# Patient Record
Sex: MALE | NOT HISPANIC OR LATINO | Employment: FULL TIME | URBAN - METROPOLITAN AREA
[De-identification: names, ages, dates, MRNs, and addresses within clinical notes are randomized per-mention and may not be internally consistent; named-entity substitution may affect disease eponyms.]

---

## 2020-08-08 ENCOUNTER — HOSPITAL ENCOUNTER (EMERGENCY)
Facility: CLINIC | Age: 25
Discharge: HOME | End: 2020-08-08
Attending: PHYSICIAN ASSISTANT | Admitting: PHYSICIAN ASSISTANT
Payer: OTHER MISCELLANEOUS

## 2020-08-08 VITALS
HEART RATE: 68 BPM | OXYGEN SATURATION: 99 % | DIASTOLIC BLOOD PRESSURE: 81 MMHG | TEMPERATURE: 98.6 F | RESPIRATION RATE: 16 BRPM | SYSTOLIC BLOOD PRESSURE: 133 MMHG

## 2020-08-08 DIAGNOSIS — T14.8XXA AVULSION OF SKIN: ICD-10-CM

## 2020-08-08 PROCEDURE — 90715 TDAP VACCINE 7 YRS/> IM: CPT | Performed by: PHYSICIAN ASSISTANT

## 2020-08-08 PROCEDURE — 25000128 H RX IP 250 OP 636: Performed by: PHYSICIAN ASSISTANT

## 2020-08-08 PROCEDURE — 90471 IMMUNIZATION ADMIN: CPT

## 2020-08-08 PROCEDURE — 99283 EMERGENCY DEPT VISIT LOW MDM: CPT | Mod: 25

## 2020-08-08 RX ADMIN — CLOSTRIDIUM TETANI TOXOID ANTIGEN (FORMALDEHYDE INACTIVATED), CORYNEBACTERIUM DIPHTHERIAE TOXOID ANTIGEN (FORMALDEHYDE INACTIVATED), BORDETELLA PERTUSSIS TOXOID ANTIGEN (GLUTARALDEHYDE INACTIVATED), BORDETELLA PERTUSSIS FILAMENTOUS HEMAGGLUTININ ANTIGEN (FORMALDEHYDE INACTIVATED), BORDETELLA PERTUSSIS PERTACTIN ANTIGEN, AND BORDETELLA PERTUSSIS FIMBRIAE 2/3 ANTIGEN 0.5 ML: 5; 2; 2.5; 5; 3; 5 INJECTION, SUSPENSION INTRAMUSCULAR at 18:12

## 2020-08-08 ASSESSMENT — ENCOUNTER SYMPTOMS
WOUND: 1
NUMBNESS: 0

## 2020-08-08 NOTE — ED PROVIDER NOTES
History     Chief Complaint:  Leg Injury    HPI   Portia Whitehead is a 25 year old male who presents to the ED for leg pain.  The patient explains that at 1130 he was at work and had flat bed cart scrape his lower shin from placing a water heater on the cart.  He states that at work they wiped it down, and had antiseptic spray placed while having abrasion cleaned. The patient says to be able to walk on his leg, and feels tingling to the abrasion but mostly from the antiseptic spray. To note TDAP was last given on 05/20/2004.     Allergies:  No known drug allergies     Medications:    The patient is not currently taking any prescribed medications.    Past Medical History:    The patient does not have any past pertinent medical history.    Past Surgical History:    History reviewed. No pertinent surgical history.    Family History:    History reviewed. No pertinent family history.     Social History:  Presents to the ED with mother     Review of Systems   Skin: Positive for wound.   Neurological: Negative for numbness.   All other systems reviewed and are negative.    Physical Exam     Patient Vitals for the past 24 hrs:   BP Temp Temp src Pulse Resp SpO2   08/08/20 1758 133/81 98.6  F (37  C) Oral 68 16 99 %       Physical Exam  General: Resting comfortably.  Alert and oriented.   Head:  The scalp, face, and head appear normal   Eyes:  Conjunctivae and sclerae are normal    CV:  DP and PT pulses intact to left foot.  Capillary refill is brisk in all digits of the left foot  Resp:  No tachypnea.  No respiratory distress.  MS:  Tenderness to the left anterior shin overlying the deep abrasion. The patient can wiggle toes without difficulty, and can dorsi and plantar flex the left ankle without difficulty.  Patient can flex and extend at the left knee without difficulty.  Patient is ambulatory.  No bony tenderness to the shin.  Skin:  Large skin abrasion noted to the left anterior/shin as depicted below.  No signs of  foreign body.  No tendon exposure or bone exposure.  No signs of cellulitis or infection.  Mild bleeding from the area.  No significant drainage.  Neuro:  Sensation intact throughout left lower extremity.            Emergency Department Course   Interventions:  1748: Adacel 0.5 mL IM    Emergency Department Course:  Past medical records, nursing notes, and vitals reviewed.    1748 I performed an exam of the patient as documented above.      Findings and plan explained to the Patient. Patient discharged home with instructions regarding supportive care, medications, and reasons to return. The importance of close follow-up was reviewed.     I personally answered all related questions prior to discharge.     Impression & Plan   Medical Decision Making:   Portia Whitehead is a 25 year old male who presents for evaluation of a left lower shin abrasion as sustained as noted in HPI.  This is consistent with a skin avulsion. This wound could not be repaired due to tissue loss and will have to heal by secondary intention. CMS intact. The wound was irrigated and explored. No FB or bony exposure. Xray is not indicated as my concern for fracture is very low and the patient is ambulatory. Tetanus was administered here.  There was no exposed bone/tendon/joint. Sensation is intact distally. Appropriate dressing was placed and dressing change instructions given to patient. There are no further injuries to suggest need for further workup at this time. Daily cares were discussed. She was asked to follow-up with his PCP in 2-3 days as needed for wound recheck.  He will be discharged home.  No indication for further work-up at this time.  Red flag symptoms, and reasons for return discussed and understood.  All questions were answered prior to discharge.  The patient understands and agrees to this plan.    Diagnosis:    ICD-10-CM    1. Avulsion of skin  T14.8XXA     left shin       Disposition:  Discharged to home.    Alfonsoe  Disclosure:  I, Figueroa Colmenares, am serving as a scribe at 5:48 PM on 8/8/2020 to document services personally performed by Miriam Brennan PA based on my observations and the provider's statements to me.        Miriam Brennan PA-C  08/08/20 1944

## 2020-08-08 NOTE — ED AVS SNAPSHOT
Emergency Department  [address] [place] [id number]  Phone:  [phone]  Fax:  [phone]                                    Portia Whitehead   MRN: [id number]    Department:   Emergency Department   Date of Visit:  8/8/2020           After Visit Summary Signature Page    I have received my discharge instructions, and my questions have been answered. I have discussed any challenges I see with this plan with the nurse or doctor.    ..........................................................................................................................................  Patient/Patient Representative Signature      ..........................................................................................................................................  Patient Representative Print Name and Relationship to Patient    ..................................................               ................................................  Date                                   Time    ..........................................................................................................................................  Reviewed by Signature/Title    ...................................................              ..............................................  Date                                               Time          22EPIC Rev 08/18

## 2020-08-08 NOTE — DISCHARGE INSTRUCTIONS
Once daily take some warm soapy water and gently scrub the area.  Please ensure that it is dry before applying a dressing.  Follow-up with your primary care doctor in 2 to 3 days for wound recheck as needed.  Return immediately for any fevers, redness surrounding the wound, purulent drainage from the wound, or any other concerns.

## 2022-10-19 ENCOUNTER — OFFICE VISIT (OUTPATIENT)
Dept: URGENT CARE | Facility: URGENT CARE | Age: 27
End: 2022-10-19
Attending: FAMILY MEDICINE

## 2022-10-19 VITALS
DIASTOLIC BLOOD PRESSURE: 61 MMHG | RESPIRATION RATE: 20 BRPM | HEART RATE: 94 BPM | OXYGEN SATURATION: 99 % | TEMPERATURE: 99.1 F | WEIGHT: 231 LBS | SYSTOLIC BLOOD PRESSURE: 108 MMHG

## 2022-10-19 DIAGNOSIS — R05.9 COUGH, UNSPECIFIED TYPE: ICD-10-CM

## 2022-10-19 DIAGNOSIS — R11.10 VOMITING AND DIARRHEA: Primary | ICD-10-CM

## 2022-10-19 DIAGNOSIS — J34.89 NASAL DISCHARGE: ICD-10-CM

## 2022-10-19 DIAGNOSIS — R19.7 VOMITING AND DIARRHEA: Primary | ICD-10-CM

## 2022-10-19 LAB
FLUAV AG SPEC QL IA: NEGATIVE
FLUBV AG SPEC QL IA: NEGATIVE
SARS-COV-2 RNA RESP QL NAA+PROBE: POSITIVE

## 2022-10-19 PROCEDURE — U0003 INFECTIOUS AGENT DETECTION BY NUCLEIC ACID (DNA OR RNA); SEVERE ACUTE RESPIRATORY SYNDROME CORONAVIRUS 2 (SARS-COV-2) (CORONAVIRUS DISEASE [COVID-19]), AMPLIFIED PROBE TECHNIQUE, MAKING USE OF HIGH THROUGHPUT TECHNOLOGIES AS DESCRIBED BY CMS-2020-01-R: HCPCS | Performed by: FAMILY MEDICINE

## 2022-10-19 PROCEDURE — U0005 INFEC AGEN DETEC AMPLI PROBE: HCPCS | Performed by: FAMILY MEDICINE

## 2022-10-19 PROCEDURE — 99203 OFFICE O/P NEW LOW 30 MIN: CPT | Mod: CS | Performed by: FAMILY MEDICINE

## 2022-10-19 PROCEDURE — 87804 INFLUENZA ASSAY W/OPTIC: CPT

## 2022-10-19 NOTE — PROGRESS NOTES
SUBJECTIVE: Chucky Whitehead is a 27 year old male presenting with a chief complaint of nasal congestion, cough  and n/v/d.  Onset of symptoms was 1 day(s) ago.    No past medical history on file.  No Known Allergies  Social History     Tobacco Use     Smoking status: Never     Smokeless tobacco: Never   Substance Use Topics     Alcohol use: Not Currently       ROS:  SKIN: no rash  GI: no vomiting    OBJECTIVE:  /61   Pulse 94   Temp 99.1  F (37.3  C)   Resp 20   Wt 104.8 kg (231 lb)   SpO2 99% GENERAL APPEARANCE: healthy, alert and no distress  EYES: EOMI,  PERRL, conjunctiva clear  HENT: ear canals and TM's normal.  Nose and mouth without ulcers, erythema or lesions  RESP: lungs clear to auscultation - no rales, rhonchi or wheezes  ABDOMEN: soft, hyperactive bowel sounds, nt  SKIN: no suspicious lesions or rashes      ICD-10-CM    1. Vomiting and diarrhea  R11.10 Influenza A & B Antigen - Clinic Collect    R19.7       2. Cough, unspecified type  R05.9       3. Nasal discharge  J34.89         symptomatic tx   Fluids/Rest, f/u if worse/not any better

## 2022-10-20 ENCOUNTER — TELEPHONE (OUTPATIENT)
Dept: INTERNAL MEDICINE | Facility: CLINIC | Age: 27
End: 2022-10-20

## 2022-10-20 NOTE — TELEPHONE ENCOUNTER
Pt was seen at  yesterday and called to request COVID-19 lab results.    He was informed of positive COVID-19 result, quarantine guidelines and warning signs that need care at ER. Pt verbalized understanding    Pt request copy of results sent to his e-mail [email or web address]. Lab result was emailed to patient as requested.     Routing message to patient care team to correct patient date of birth and address. Pt's correct birthday is [birth year].